# Patient Record
(demographics unavailable — no encounter records)

---

## 2024-10-24 NOTE — HISTORY OF PRESENT ILLNESS
[de-identified] : Lenka was last seen: 6/11/24  [de-identified] : Reason for visit: Anemia and post CAR-T follow-up   Since last visit: TEN from amoxicillin   Medications:  Cymbalta 60mg  Synthroid 150mcg daily oxycodone  Examination: Lenka is articulate and in no acute distress flat affect No occiput, poster cervical, anterior cervical, submandibular, sublingual, submental, supraclavicular nor axillary adenopathy Lungs: Clear Cardiac: without rubs Abd: soft and non-tender, Traube's space is tympanitic No inguinal nor femoral adenopathy No sig peripheral edema Gait: unencumbered  back brace CBC 10/30/23: WBC 0.3  Hgb  9.0  PLT   8,000 12/15/23: WBC 3.6, Hgb 8.6, PLT  16,000; ANC 2.9; Clonoseq 4 residual sequences per mL of plasma  01/16/24: WBC 3.0, Hgb 7.9, PLT  13,000; ANC 2.4; EPO: 238.3 02/13/24: WBC 2.2, Hgb 9.5, PLT 20,000, ANC 1.2; Clonoseq: 0 residual sequences 04/01/24: WBC 2.9, Hgb 7.1, PLT 23,000; ANC 2.2 04/25/24: WBC 0.1, Hgb 6.3, PLT 35,000 05/16/24: WBC 1.0, Hgb 8.1 (last 5/13/24 - two units), PLT 11,000; ANC 06 06/11/24: WBC 3.2, Hgb 9.7, PLT 19,000 10/24/24: WBC 1.3, Hgb 9.8, PLT 14,000 - last RBC 10 days; Aranesp, ANC 0.8

## 2024-10-24 NOTE — HISTORY OF PRESENT ILLNESS
[de-identified] : Lenka was last seen: 6/11/24  [de-identified] : Reason for visit: Anemia and post CAR-T follow-up   Since last visit: TEN from amoxicillin   Medications:  Cymbalta 60mg  Synthroid 150mcg daily oxycodone  Examination: Lenka is articulate and in no acute distress flat affect No occiput, poster cervical, anterior cervical, submandibular, sublingual, submental, supraclavicular nor axillary adenopathy Lungs: Clear Cardiac: without rubs Abd: soft and non-tender, Traube's space is tympanitic No inguinal nor femoral adenopathy No sig peripheral edema Gait: unencumbered  back brace CBC 10/30/23: WBC 0.3  Hgb  9.0  PLT   8,000 12/15/23: WBC 3.6, Hgb 8.6, PLT  16,000; ANC 2.9; Clonoseq 4 residual sequences per mL of plasma  01/16/24: WBC 3.0, Hgb 7.9, PLT  13,000; ANC 2.4; EPO: 238.3 02/13/24: WBC 2.2, Hgb 9.5, PLT 20,000, ANC 1.2; Clonoseq: 0 residual sequences 04/01/24: WBC 2.9, Hgb 7.1, PLT 23,000; ANC 2.2 04/25/24: WBC 0.1, Hgb 6.3, PLT 35,000 05/16/24: WBC 1.0, Hgb 8.1 (last 5/13/24 - two units), PLT 11,000; ANC 06 06/11/24: WBC 3.2, Hgb 9.7, PLT 19,000 10/24/24: WBC 1.3, Hgb 9.8, PLT 14,000 - last RBC 10 days; Aranesp, ANC 0.8

## 2024-10-24 NOTE — BEGINNING OF VISIT
[PHQ-2 Positive] : PHQ-2 Positive [Pain Scale: ___] : On a scale of 1-10, today the patient's pain is a(n) [unfilled]. [Medication(s)] : Medication(s) [Former] : Former [> 15 Years] : > 15 Years [Date Discussed (MM/DD/YY): ___] : Discussed: [unfilled] [With Patient/Caregiver] : with Patient/Caregiver [Provided Coping Management Support] : Provided Coping Management Support [Detailed Documented Plan in Note] : Detailed Documented Plan in Note actual

## 2024-10-24 NOTE — BEGINNING OF VISIT
[PHQ-2 Positive] : PHQ-2 Positive [Pain Scale: ___] : On a scale of 1-10, today the patient's pain is a(n) [unfilled]. [Medication(s)] : Medication(s) [Former] : Former [> 15 Years] : > 15 Years [Date Discussed (MM/DD/YY): ___] : Discussed: [unfilled] [With Patient/Caregiver] : with Patient/Caregiver [Provided Coping Management Support] : Provided Coping Management Support [Detailed Documented Plan in Note] : Detailed Documented Plan in Note

## 2024-10-24 NOTE — HISTORY OF PRESENT ILLNESS
[de-identified] : Lenka was last seen: 6/11/24  [de-identified] : Reason for visit: Anemia and post CAR-T follow-up   Since last visit: TEN from amoxicillin   Medications:  Cymbalta 60mg  Synthroid 150mcg daily oxycodone  Examination: Lenka is articulate and in no acute distress flat affect No occiput, poster cervical, anterior cervical, submandibular, sublingual, submental, supraclavicular nor axillary adenopathy Lungs: Clear Cardiac: without rubs Abd: soft and non-tender, Traube's space is tympanitic No inguinal nor femoral adenopathy No sig peripheral edema Gait: unencumbered  back brace CBC 10/30/23: WBC 0.3  Hgb  9.0  PLT   8,000 12/15/23: WBC 3.6, Hgb 8.6, PLT  16,000; ANC 2.9; Clonoseq 4 residual sequences per mL of plasma  01/16/24: WBC 3.0, Hgb 7.9, PLT  13,000; ANC 2.4; EPO: 238.3 02/13/24: WBC 2.2, Hgb 9.5, PLT 20,000, ANC 1.2; Clonoseq: 0 residual sequences 04/01/24: WBC 2.9, Hgb 7.1, PLT 23,000; ANC 2.2 04/25/24: WBC 0.1, Hgb 6.3, PLT 35,000 05/16/24: WBC 1.0, Hgb 8.1 (last 5/13/24 - two units), PLT 11,000; ANC 06 06/11/24: WBC 3.2, Hgb 9.7, PLT 19,000 10/24/24: WBC 1.3, Hgb 9.8, PLT 14,000 - last RBC 10 days; Aranesp, ANC 0.8

## 2024-11-13 NOTE — ASSESSMENT
[FreeTextEntry1] : Ms. RJ NIETO is a 68 year old female who presents with worsening low back pain likely related to progression of L2 compression fracture in setting of mantle cell lymphoma with persistent low platelets. Denies any red flag signs. Will recommend: - Patient to continue LSO brace. - Patient not interested in surgical consultation at this time. Patient not currently a candidate for kyphoplasty given platelet count - Patient to try to make pallaitive care consultation.  Return as needed. Patient aware of red flag signs including any changes to their bowel/bladder control, groin numbness or new weakness. Patient knows to seek immediate attention by calling 911 or going to nearest ER if these symptoms appear.

## 2024-11-13 NOTE — PHYSICAL EXAM
[FreeTextEntry1] : PE: Constitutional: In NAD, calm and cooperative MSK (Back)  Inspection: no gross swelling identified  Palpation: Mild tenderness of the bilateral lumbar paraspinals  ROM: Pain at end lumbar extension and with 45 degrees flexion  Strength: 5/5 strength in bilateral lower extremities  Reflexes: 2+ Patella reflex bilaterally, 2+ Achilles reflex bilaterally, negative clonus bilaterally  Sensation: Intact to light touch in bilateral lower extremities Special tests: SLR: negative bilaterally MARNI: negative bilaterally FADIR: negative bilaterally.

## 2024-11-13 NOTE — HISTORY OF PRESENT ILLNESS
[FreeTextEntry1] : Ms. RJ NIETO is a 69 year old female who presents for follow up. At last visit in 4/2024, she was continued on her LSO brace and advised to make palliative care consultation.    Location: [facility] low [facility] Onset: Chronic since 10/2023, was in hospital immediately prior, gradually worsening Provocation/Palliative: Worse with weight bearing and walking, better with rest Quality: Sharp, knife life Radiation: None Severity: Moderate-severe Timing: Not improving  No bowel/bladder changes. No groin numbness.